# Patient Record
Sex: MALE | Race: WHITE | Employment: STUDENT | URBAN - METROPOLITAN AREA
[De-identification: names, ages, dates, MRNs, and addresses within clinical notes are randomized per-mention and may not be internally consistent; named-entity substitution may affect disease eponyms.]

---
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
## 2023-01-19 ENCOUNTER — HOSPITAL ENCOUNTER (EMERGENCY)
Age: 18
Discharge: HOME | End: 2023-01-20
Attending: STUDENT IN AN ORGANIZED HEALTH CARE EDUCATION/TRAINING PROGRAM
Payer: COMMERCIAL

## 2023-01-19 ENCOUNTER — APPOINTMENT (OUTPATIENT)
Dept: ULTRASOUND IMAGING | Age: 18
End: 2023-01-19
Payer: COMMERCIAL

## 2023-01-19 VITALS
RESPIRATION RATE: 16 BRPM | SYSTOLIC BLOOD PRESSURE: 128 MMHG | HEART RATE: 97 BPM | WEIGHT: 230 LBS | OXYGEN SATURATION: 98 % | BODY MASS INDEX: 31.19 KG/M2 | DIASTOLIC BLOOD PRESSURE: 81 MMHG | TEMPERATURE: 98.4 F

## 2023-01-19 DIAGNOSIS — E83.42 HYPOMAGNESEMIA: ICD-10-CM

## 2023-01-19 DIAGNOSIS — K92.0 HEMATEMESIS WITH NAUSEA: Primary | ICD-10-CM

## 2023-01-19 LAB
ALBUMIN SERPL-MCNC: 4.5 GM/DL (ref 3.4–5)
ALP BLD-CCNC: 66 IU/L (ref 40–129)
ALT SERPL-CCNC: 51 U/L (ref 10–40)
ANION GAP SERPL CALCULATED.3IONS-SCNC: 9 MMOL/L (ref 4–16)
APTT: 31.5 SECONDS (ref 25.1–37.1)
AST SERPL-CCNC: 27 IU/L (ref 15–37)
BASOPHILS ABSOLUTE: 0 K/CU MM
BASOPHILS RELATIVE PERCENT: 0.2 % (ref 0–1)
BILIRUB SERPL-MCNC: 0.4 MG/DL (ref 0–1)
BILIRUBIN DIRECT: 0.2 MG/DL (ref 0–0.3)
BILIRUBIN, INDIRECT: 0.2 MG/DL (ref 0–0.7)
BUN BLDV-MCNC: 12 MG/DL (ref 6–23)
CALCIUM SERPL-MCNC: 9.2 MG/DL (ref 8.3–10.6)
CHLORIDE BLD-SCNC: 104 MMOL/L (ref 99–110)
CO2: 26 MMOL/L (ref 21–32)
CREAT SERPL-MCNC: 1 MG/DL (ref 0.9–1.3)
DIFFERENTIAL TYPE: ABNORMAL
EOSINOPHILS ABSOLUTE: 0 K/CU MM
EOSINOPHILS RELATIVE PERCENT: 0.3 % (ref 0–3)
GFR SERPL CREATININE-BSD FRML MDRD: >60 ML/MIN/1.73M2
GLUCOSE BLD-MCNC: 144 MG/DL (ref 70–99)
HCT VFR BLD CALC: 48.3 % (ref 42–52)
HEMOGLOBIN: 15.2 GM/DL (ref 13.5–18)
IMMATURE NEUTROPHIL %: 0.3 % (ref 0–0.43)
INR BLD: 1.08 INDEX
LIPASE: 25 IU/L (ref 13–60)
LYMPHOCYTES ABSOLUTE: 1.6 K/CU MM
LYMPHOCYTES RELATIVE PERCENT: 11.3 % (ref 24–44)
MAGNESIUM: 1.7 MG/DL (ref 1.8–2.4)
MCH RBC QN AUTO: 27.4 PG (ref 27–31)
MCHC RBC AUTO-ENTMCNC: 31.5 % (ref 32–36)
MCV RBC AUTO: 87 FL (ref 78–100)
MONOCYTES ABSOLUTE: 0.7 K/CU MM
MONOCYTES RELATIVE PERCENT: 4.7 % (ref 0–4)
NUCLEATED RBC %: 0 %
PDW BLD-RTO: 12.5 % (ref 11.7–14.9)
PLATELET # BLD: 242 K/CU MM (ref 140–440)
PMV BLD AUTO: 10.3 FL (ref 7.5–11.1)
POTASSIUM SERPL-SCNC: 4 MMOL/L (ref 3.5–5.1)
PROTHROMBIN TIME: 13.9 SECONDS (ref 11.7–14.5)
RBC # BLD: 5.55 M/CU MM (ref 4.6–6.2)
SEGMENTED NEUTROPHILS ABSOLUTE COUNT: 11.6 K/CU MM
SEGMENTED NEUTROPHILS RELATIVE PERCENT: 83.2 % (ref 36–66)
SODIUM BLD-SCNC: 139 MMOL/L (ref 135–145)
TOTAL IMMATURE NEUTOROPHIL: 0.04 K/CU MM
TOTAL NUCLEATED RBC: 0 K/CU MM
TOTAL PROTEIN: 7.4 GM/DL (ref 6.4–8.2)
WBC # BLD: 14 K/CU MM (ref 4–10.5)

## 2023-01-19 PROCEDURE — 80053 COMPREHEN METABOLIC PANEL: CPT

## 2023-01-19 PROCEDURE — 2500000003 HC RX 250 WO HCPCS: Performed by: STUDENT IN AN ORGANIZED HEALTH CARE EDUCATION/TRAINING PROGRAM

## 2023-01-19 PROCEDURE — 83735 ASSAY OF MAGNESIUM: CPT

## 2023-01-19 PROCEDURE — 2580000003 HC RX 258: Performed by: STUDENT IN AN ORGANIZED HEALTH CARE EDUCATION/TRAINING PROGRAM

## 2023-01-19 PROCEDURE — 99284 EMERGENCY DEPT VISIT MOD MDM: CPT

## 2023-01-19 PROCEDURE — 76705 ECHO EXAM OF ABDOMEN: CPT

## 2023-01-19 PROCEDURE — 85610 PROTHROMBIN TIME: CPT

## 2023-01-19 PROCEDURE — 6360000002 HC RX W HCPCS: Performed by: STUDENT IN AN ORGANIZED HEALTH CARE EDUCATION/TRAINING PROGRAM

## 2023-01-19 PROCEDURE — 85025 COMPLETE CBC W/AUTO DIFF WBC: CPT

## 2023-01-19 PROCEDURE — 85730 THROMBOPLASTIN TIME PARTIAL: CPT

## 2023-01-19 PROCEDURE — 82248 BILIRUBIN DIRECT: CPT

## 2023-01-19 PROCEDURE — 96374 THER/PROPH/DIAG INJ IV PUSH: CPT

## 2023-01-19 PROCEDURE — A4216 STERILE WATER/SALINE, 10 ML: HCPCS | Performed by: STUDENT IN AN ORGANIZED HEALTH CARE EDUCATION/TRAINING PROGRAM

## 2023-01-19 PROCEDURE — 83690 ASSAY OF LIPASE: CPT

## 2023-01-19 PROCEDURE — 96375 TX/PRO/DX INJ NEW DRUG ADDON: CPT

## 2023-01-19 RX ORDER — ONDANSETRON 4 MG/1
4 TABLET, ORALLY DISINTEGRATING ORAL 3 TIMES DAILY PRN
Qty: 21 TABLET | Refills: 0 | Status: SHIPPED | OUTPATIENT
Start: 2023-01-19

## 2023-01-19 RX ORDER — ONDANSETRON 2 MG/ML
4 INJECTION INTRAMUSCULAR; INTRAVENOUS EVERY 6 HOURS PRN
Status: DISCONTINUED | OUTPATIENT
Start: 2023-01-19 | End: 2023-01-20 | Stop reason: HOSPADM

## 2023-01-19 RX ORDER — MORPHINE SULFATE 4 MG/ML
4 INJECTION, SOLUTION INTRAMUSCULAR; INTRAVENOUS ONCE
Status: COMPLETED | OUTPATIENT
Start: 2023-01-19 | End: 2023-01-19

## 2023-01-19 RX ORDER — LANOLIN ALCOHOL/MO/W.PET/CERES
400 CREAM (GRAM) TOPICAL ONCE
Status: COMPLETED | OUTPATIENT
Start: 2023-01-19 | End: 2023-01-20

## 2023-01-19 RX ORDER — 0.9 % SODIUM CHLORIDE 0.9 %
1000 INTRAVENOUS SOLUTION INTRAVENOUS ONCE
Status: COMPLETED | OUTPATIENT
Start: 2023-01-19 | End: 2023-01-19

## 2023-01-19 RX ORDER — ONDANSETRON 4 MG/1
4 TABLET, FILM COATED ORAL 3 TIMES DAILY PRN
Qty: 15 TABLET | Refills: 0 | Status: SHIPPED | OUTPATIENT
Start: 2023-01-19

## 2023-01-19 RX ADMIN — SODIUM CHLORIDE 1000 ML: 9 INJECTION, SOLUTION INTRAVENOUS at 21:29

## 2023-01-19 RX ADMIN — ONDANSETRON 4 MG: 2 INJECTION INTRAMUSCULAR; INTRAVENOUS at 21:35

## 2023-01-19 RX ADMIN — MORPHINE SULFATE 4 MG: 4 INJECTION, SOLUTION INTRAMUSCULAR; INTRAVENOUS at 21:35

## 2023-01-19 RX ADMIN — FAMOTIDINE 20 MG: 10 INJECTION, SOLUTION INTRAVENOUS at 21:35

## 2023-01-19 NOTE — Clinical Note
Julee Sims was seen and treated in our emergency department on 1/19/2023. He may return to work on 01/21/2023. If you have any questions or concerns, please don't hesitate to call.       Cornelio Segundo MD

## 2023-01-20 PROCEDURE — 6370000000 HC RX 637 (ALT 250 FOR IP): Performed by: STUDENT IN AN ORGANIZED HEALTH CARE EDUCATION/TRAINING PROGRAM

## 2023-01-20 RX ADMIN — Medication 400 MG: at 00:10

## 2023-01-20 NOTE — ED PROVIDER NOTES
Emergency Department Encounter    Patient: Hunter Tamez  MRN: [id number]  : 2005  Date of Evaluation: 2023  ED Provider:  José Miguel Lawton DO    Triage Chief Complaint:   Hematemesis (Dark red blood)    [place]:  Hunter Tamez is a 19 y.o. male with no significant medical history who presents to the ED with a history of right upper quadrant pain which started today. Patient also endorses a history of hematemesis. Patient denies any associated history of fever, diarrhea/constipation, fatigue, recent surgery or procedure, dysuria, or penile discharge. ROS - see HPI, below listed is current ROS at time of my eval:  Review of Systems    ROS is an in history; otherwise unremarkable    Past Medical History:   Diagnosis Date    Allergic     Anxiety     Depression      History reviewed. No pertinent surgical history. History reviewed. No pertinent family history.   Social History     Socioeconomic History    Marital status: Single     Spouse name: Not on file    Number of children: Not on file    Years of education: Not on file    Highest education level: Not on file   Occupational History    Not on file   Tobacco Use    Smoking status: Never    Smokeless tobacco: Never   Substance and Sexual Activity    Alcohol use: No    Drug use: No    Sexual activity: Never   Other Topics Concern    Not on file   Social History Narrative    Not on file     Social Determinants of Health     Financial Resource Strain: Not on file   Food Insecurity: Not on file   Transportation Needs: Not on file   Physical Activity: Not on file   Stress: Not on file   Social Connections: Not on file   Intimate Partner Violence: Not on file   Housing Stability: Not on file     Current Facility-Administered Medications   Medication Dose Route Frequency Provider Last Rate Last Admin    ondansetron (ZOFRAN) injection 4 mg  4 mg IntraVENous Q6H PRN Wilma Medrano DO   4 mg at 234    magnesium oxide (MAG-OX) tablet 400 mg  400 mg Oral Once Wilma Monteirooro, DO         Current Outpatient Medications   Medication Sig Dispense Refill    ondansetron (ZOFRAN-ODT) 4 MG disintegrating tablet Take 1 tablet by mouth 3 times daily as needed for Nausea or Vomiting 21 tablet 0    albuterol sulfate HFA (PROVENTIL HFA) 108 (90 Base) MCG/ACT inhaler Inhale 2 puffs into the lungs every 6 hours as needed for Wheezing or Shortness of Breath 18 g 0    Pseudoephedrine-DM-GG (CAPMIST DM) 60- MG TABS Take 1 tablet by mouth every 6 hours as needed (Cough, congestion) (Patient not taking: Reported on 10/18/2022) 30 tablet 0    FLUoxetine (PROZAC) 20 MG capsule Take 60 mg by mouth daily (Patient not taking: Reported on 10/18/2022)      loratadine (CLARITIN) 10 MG capsule Take by mouth      diphenhydrAMINE-zinc acetate (BENADRYL) 1-0.1 % cream Apply topically 3 times daily as needed. (Patient not taking: Reported on 10/18/2022) 1 Tube 0     No Known Allergies    Nursing Notes Reviewed    Physical Exam:  Triage VS:    ED Triage Vitals [01/19/23 2044]   Enc Vitals Group      /81      Heart Rate 97      Resp 16      Temp 98.4 °F (36.9 °C)      Temp Source Oral      SpO2 98 %      Weight 230 lb (104.3 kg)      Height       Head Circumference       Peak Flow       Pain Score       Pain Loc       Pain Edu? Excl. in [address]? Physical Exam  Abdominal:          Comments: Mild to moderate right upper quadrants as well as epigastric tenderness         My pulse ox interpretation is - normal    General appearance:  Mild acute distress. Skin:  Warm. Dry. Eye:  Extraocular movements intact. Ears, nose, mouth and throat:  Oral mucosa moist   Neck:  Trachea midline. Extremity:  No obvious deformity, erythema, or warmth. No swelling. Normal ROM. Distal pulses intact. Heart:  Regular rate and rhythm, normal S1 & S2, no extra heart sounds.     Perfusion:  intact  Respiratory:  Lungs clear to auscultation bilaterally. Respirations nonlabored. Abdominal:  Normal bowel sounds. Soft. RUQ and epigastric tenderness. Non distended. No Organomegaly. No jacobo, Mcburney, Rovsing, fluctuance, shifting dullness  Back:  No CVA tenderness to palpation     Neurological:  Alert and oriented times 3. No focal neuro deficits.              Psychiatric:  Appropriate    I have reviewed and interpreted all of the currently available lab results from this visit (if applicable):  Results for orders placed or performed during the hospital encounter of 01/19/23   BMP   Result Value Ref Range    Sodium 139 135 - 145 MMOL/L    Potassium 4.0 3.5 - 5.1 MMOL/L    Chloride 104 99 - 110 mMol/L    CO2 26 21 - 32 MMOL/L    Anion Gap 9 4 - 16    BUN 12 6 - 23 MG/DL    Creatinine 1.0 0.9 - 1.3 MG/DL    Est, Glom Filt Rate >60 >60 mL/min/1.73m2    Glucose 144 (H) 70 - 99 MG/DL    Calcium 9.2 8.3 - 10.6 MG/DL   CBC with Auto Differential   Result Value Ref Range    WBC 14.0 (H) 4.0 - 10.5 K/CU MM    RBC 5.55 4.6 - 6.2 M/CU MM    Hemoglobin 15.2 13.5 - 18.0 GM/DL    Hematocrit 48.3 42 - 52 %    MCV 87.0 78 - 100 FL    MCH 27.4 27 - 31 PG    MCHC 31.5 (L) 32.0 - 36.0 %    RDW 12.5 11.7 - 14.9 %    Platelets 483 614 - 925 K/CU MM    MPV 10.3 7.5 - 11.1 FL    Differential Type AUTOMATED DIFFERENTIAL     Segs Relative 83.2 (H) 36 - 66 %    Lymphocytes % 11.3 (L) 24 - 44 %    Monocytes % 4.7 (H) 0 - 4 %    Eosinophils % 0.3 0 - 3 %    Basophils % 0.2 0 - 1 %    Segs Absolute 11.6 K/CU MM    Lymphocytes Absolute 1.6 K/CU MM    Monocytes Absolute 0.7 K/CU MM    Eosinophils Absolute 0.0 K/CU MM    Basophils Absolute 0.0 K/CU MM    Nucleated RBC % 0.0 %    Total Nucleated RBC 0.0 K/CU MM    Total Immature Neutrophil 0.04 K/CU MM    Immature Neutrophil % 0.3 0 - 0.43 %   Magnesium   Result Value Ref Range    Magnesium 1.7 (L) 1.8 - 2.4 mg/dl   Protime-INR   Result Value Ref Range    Protime 13.9 11.7 - 14.5 SECONDS    INR 1.08 INDEX   Hepatic Function Panel Result Value Ref Range    Albumin 4.5 3.4 - 5.0 GM/DL    Total Bilirubin 0.4 0.0 - 1.0 MG/DL    Bilirubin, Direct 0.2 0.0 - 0.3 MG/DL    Bilirubin, Indirect 0.2 0 - 0.7 MG/DL    Alkaline Phosphatase 66 40 - 129 IU/L    AST 27 15 - 37 IU/L    ALT 51 (H) 10 - 40 U/L    Total Protein 7.4 6.4 - 8.2 GM/DL   Lipase   Result Value Ref Range    Lipase 25 13 - 60 IU/L   APTT   Result Value Ref Range    aPTT 31.5 25.1 - 37.1 SECONDS      Radiographs (if obtained):  Radiologist's Report Reviewed:  No results found. EKG (if obtained): (All EKG's are interpreted by myself in the absence of a cardiologist)    CRITICAL CARE NOTE:  There was a high probability of clinically significant life-threatening deterioration of the patient's condition requiring my urgent intervention due to abdominal pain with hematemesis. IVF,IV medications and bedside monitoring was performed to address this. Total critical care time is AT LEAST 15 minutes. This includes vital sign monitoring, pulse oximetry monitoring, telemetry monitoring, clinical response to the IV medications, reviewing the nursing notes, consultation time, dictation/documentation time, and interpretation of the lab work. This time excludes time spent performing procedures and separately billable procedures and family discussion time. MDM:    History source: Patient    History Limitations: None    78-year-old male with no significant medical history who presents to the ED with a history of right upper quadrant pain which started today. Patient also endorses a history of hematemesis. Patient denies any associated history of fever, diarrhea/constipation, fatigue, recent surgery or procedure, dysuria, or penile discharge. Patient physical examination is significant for mild right upper quadrant and epigastric tenderness.      Differentials considered include, but are not limited to include:    - Cholecystitis, hepatitis, cholangitis, primary sclerosing cholangitis, HCC, KNOTT, cirrhosis, R lower lobe pneumonia  - Pancreatitis, PUD, gastritis, esophageal varices, mesenteric ischemia, SBO, abdominal hernia,    Laboratory evaluations  --Considered: CBC, Electrolytes, Lipase, UA, Lactic acid, troponin, Stool Studies, C. Diff  --Done: CBC, electrolytes, hepatic function panel, lipase, magnesium, coagulation profile  -- Significant results: mildly low magnesium levels    Radiology include:  ---Considered: CT-abdomen/Pelvis, US  --- Done: Right upper quadrant ultrasound  --Significant results: pending      Medications:   Morphine, Zofran, famotidine--all IV    Consults   -none     Social Determinants affecting management or disposition:  none    Disposition considered: discharge    Disposition  - Patient care is endorsed to Dr Eloina Alatmirano:  1. Hematemesis with nausea    2. Hypomagnesemia      Disposition referral (if applicable): Marvin Palma MD  [address] [place] [address]  [phone]    Schedule an appointment as soon as possible for a visit     Disposition medications (if applicable):  New Prescriptions    ONDANSETRON (ZOFRAN-ODT) 4 MG DISINTEGRATING TABLET    Take 1 tablet by mouth 3 times daily as needed for Nausea or Vomiting     ED Provider Disposition Time  DISPOSITION        Comment: Please note this report has been produced using speech recognition software and may contain errors related to that system including errors in grammar, punctuation, and spelling, as well as words and phrases that may be inappropriate. Efforts were made to edit the dictations.        [address] [facility],[address], DO  01/20/23 2067

## 2023-01-20 NOTE — ED PROVIDER NOTES
Emergency Department Encounter  Location: [address] [place] [address] EMERGENCY DEPARTMENT    Patient: Aretha Chavez  MRN: [id number]  : 2005  Date of evaluation: 2023  ED Provider: Hali Forrest MD    2300:p.mAliyah Chavez was checked out to me by Dr. Luis Felipe Gomez. Please see his/her initial documentation for details of the patient's initial ED presentation, physical exam and completed studies. In brief, Aretha Chavez is a 19 y.o. male that presented to the emergency department with nausea, vomiting, diarrhea and vomitus with blood.     I have reviewed and interpreted all of the currently available lab results and diagnostics from this visit:  Results for orders placed or performed during the hospital encounter of 23   BMP   Result Value Ref Range    Sodium 139 135 - 145 MMOL/L    Potassium 4.0 3.5 - 5.1 MMOL/L    Chloride 104 99 - 110 mMol/L    CO2 26 21 - 32 MMOL/L    Anion Gap 9 4 - 16    BUN 12 6 - 23 MG/DL    Creatinine 1.0 0.9 - 1.3 MG/DL    Est, Glom Filt Rate >60 >60 mL/min/1.73m2    Glucose 144 (H) 70 - 99 MG/DL    Calcium 9.2 8.3 - 10.6 MG/DL   CBC with Auto Differential   Result Value Ref Range    WBC 14.0 (H) 4.0 - 10.5 K/CU MM    RBC 5.55 4.6 - 6.2 M/CU MM    Hemoglobin 15.2 13.5 - 18.0 GM/DL    Hematocrit 48.3 42 - 52 %    MCV 87.0 78 - 100 FL    MCH 27.4 27 - 31 PG    MCHC 31.5 (L) 32.0 - 36.0 %    RDW 12.5 11.7 - 14.9 %    Platelets 244 753 - 666 K/CU MM    MPV 10.3 7.5 - 11.1 FL    Differential Type AUTOMATED DIFFERENTIAL     Segs Relative 83.2 (H) 36 - 66 %    Lymphocytes % 11.3 (L) 24 - 44 %    Monocytes % 4.7 (H) 0 - 4 %    Eosinophils % 0.3 0 - 3 %    Basophils % 0.2 0 - 1 %    Segs Absolute 11.6 K/CU MM    Lymphocytes Absolute 1.6 K/CU MM    Monocytes Absolute 0.7 K/CU MM    Eosinophils Absolute 0.0 K/CU MM    Basophils Absolute 0.0 K/CU MM    Nucleated RBC % 0.0 %    Total Nucleated RBC 0.0 K/CU MM    Total Immature Neutrophil 0.04 K/CU MM    Immature Neutrophil % 0.3 0 - 0.43 %   Magnesium   Result Value Ref Range    Magnesium 1.7 (L) 1.8 - 2.4 mg/dl   Protime-INR   Result Value Ref Range    Protime 13.9 11.7 - 14.5 SECONDS    INR 1.08 INDEX   Hepatic Function Panel   Result Value Ref Range    Albumin 4.5 3.4 - 5.0 GM/DL    Total Bilirubin 0.4 0.0 - 1.0 MG/DL    Bilirubin, Direct 0.2 0.0 - 0.3 MG/DL    Bilirubin, Indirect 0.2 0 - 0.7 MG/DL    Alkaline Phosphatase 66 40 - 129 IU/L    AST 27 15 - 37 IU/L    ALT 51 (H) 10 - 40 U/L    Total Protein 7.4 6.4 - 8.2 GM/DL   Lipase   Result Value Ref Range    Lipase 25 13 - 60 IU/L   APTT   Result Value Ref Range    aPTT 31.5 25.1 - 37.1 SECONDS     US ABDOMEN LIMITED Specify organ? GALLBLADDER    Result Date: 1/19/2023  EXAMINATION: RIGHT UPPER QUADRANT ULTRASOUND 1/19/2023 10:22 pm COMPARISON: None. HISTORY: ORDERING SYSTEM PROVIDED HISTORY: RUQ pain TECHNOLOGIST PROVIDED HISTORY: Reason for exam:->RUQ pain Specify organ?->GALLBLADDER FINDINGS: LIVER:  Diffuse fatty liver infiltration. No focal mass is identified. No ductal dilation. Focal fatty sparing noted adjacent to the gallbladder fossa. BILIARY SYSTEM:  Gallbladder is unremarkable without evidence of pericholecystic fluid, wall thickening or stones. Negative sonographic Osman's sign. Common bile duct is within normal limits measuring 4.2 mm. RIGHT KIDNEY: The right kidney is grossly unremarkable without evidence of hydronephrosis. PANCREAS:  Visualized portions of the pancreas are unremarkable. OTHER: No evidence of right upper quadrant ascites. 1. Fatty liver infiltration. Focal fatty sparing adjacent to the gallbladder fossa. 2. No cholelithiasis or cholecystitis. Final ED Course and MDM:    CC/HPI Summary, DDx, ED Course, and Reassessment: Patient had presented with nausea, vomiting, diarrhea and hematemesis, likely Caty-Ryder tear. He is hemodynamically stable.   He did have a leukocytosis but otherwise normal hemoglobin and no metabolic abnormalities, transaminitis or elevated lipase. Right upper quadrant sound was unremarkable for any acute findings. Patient with improved symptoms after treatment here in the ED. Low suspicion for other acute life-threatening emergent process. Doubt variceal bleed. Patient will be discharged home with continued supportive care. History from : Patient    Limitations to history : None    Patient was given the following medications:  Medications   ondansetron (ZOFRAN) injection 4 mg (4 mg IntraVENous Given 1/19/23 2135)   magnesium oxide (MAG-OX) tablet 400 mg (has no administration in time range)   0.9 % sodium chloride bolus (0 mLs IntraVENous Stopped 1/19/23 2247)   morphine sulfate (PF) injection 4 mg (4 mg IntraVENous Given 1/19/23 2135)   famotidine (PEPCID) 20 mg in sodium chloride (PF) 0.9 % 10 mL injection (20 mg IntraVENous Given 1/19/23 2135)       Independent Imaging Interpretation by me:     EKG (if obtained): (All EKG's are interpreted by myself in the absence of a cardiologist)     Chronic conditions affecting care: none    Discussion with Other Profesionals : None    Social Determinants : None    Records Reviewed : None      I am the Primary Clinician of Record. Final Impression      1. Hematemesis with nausea    2.  Hypomagnesemia        DISPOSITION Decision To Discharge 01/19/2023 11:41:10 PM     (Please note that portions of this note may have been completed with a voice recognition program. Efforts were made to edit the dictations but occasionally words are mis-transcribed.)    Ansel Lanes, MD  [address] [place] [address], MD  01/19/23 1946

## 2023-03-30 ENCOUNTER — HOSPITAL ENCOUNTER (EMERGENCY)
Age: 18
Discharge: HOME | End: 2023-03-30
Payer: COMMERCIAL

## 2023-03-30 VITALS
TEMPERATURE: 98.5 F | BODY MASS INDEX: 38.65 KG/M2 | HEART RATE: 72 BPM | WEIGHT: 270 LBS | HEIGHT: 70 IN | RESPIRATION RATE: 16 BRPM | SYSTOLIC BLOOD PRESSURE: 132 MMHG | DIASTOLIC BLOOD PRESSURE: 78 MMHG | OXYGEN SATURATION: 100 %

## 2023-03-30 DIAGNOSIS — T78.40XA ACUTE ALLERGIC REACTION, INITIAL ENCOUNTER: Primary | ICD-10-CM

## 2023-03-30 PROCEDURE — 6370000000 HC RX 637 (ALT 250 FOR IP): Performed by: PHYSICIAN ASSISTANT

## 2023-03-30 PROCEDURE — 99283 EMERGENCY DEPT VISIT LOW MDM: CPT

## 2023-03-30 RX ORDER — PREDNISONE 20 MG/1
60 TABLET ORAL ONCE
Status: COMPLETED | OUTPATIENT
Start: 2023-03-30 | End: 2023-03-30

## 2023-03-30 RX ORDER — DIPHENHYDRAMINE HCL 25 MG
50 TABLET ORAL ONCE
Status: COMPLETED | OUTPATIENT
Start: 2023-03-30 | End: 2023-03-30

## 2023-03-30 RX ORDER — FAMOTIDINE 20 MG/1
20 TABLET, FILM COATED ORAL ONCE
Status: COMPLETED | OUTPATIENT
Start: 2023-03-30 | End: 2023-03-30

## 2023-03-30 RX ADMIN — FAMOTIDINE 20 MG: 20 TABLET, FILM COATED ORAL at 20:32

## 2023-03-30 RX ADMIN — DIPHENHYDRAMINE HYDROCHLORIDE 50 MG: 25 TABLET ORAL at 20:32

## 2023-03-30 RX ADMIN — PREDNISONE 60 MG: 20 TABLET ORAL at 20:32

## 2023-03-30 ASSESSMENT — LIFESTYLE VARIABLES
HOW MANY STANDARD DRINKS CONTAINING ALCOHOL DO YOU HAVE ON A TYPICAL DAY: PATIENT DOES NOT DRINK
HOW OFTEN DO YOU HAVE A DRINK CONTAINING ALCOHOL: NEVER

## 2023-03-30 NOTE — ED TRIAGE NOTES
Patient presents to the ED with complaint of allergic reaction. Patient states his throat is itchy and has red bumps on the back, patient also states his forehead is swelling and his cheeks. Patient states his symptoms started an hour ago and have been getting worse. Patient states he took 30mg of zyrtec prior to coming in.

## 2023-03-31 NOTE — ED PROVIDER NOTES
Triage Chief Complaint:   Allergic Reaction    Jackson:  Today in the ED I had the pleasure of caring for Jessie Benton who is a 19 y.o. male that presents today to the emergency department for allergic reaction. Patient has seasonal allergies no medication or food allergies. States that just prior to arrival patient started feeling itchy in the throat. Watery eyes. Felt like he was having swelling around his eyes so he came in for evaluation. Denies any tightness in the throat. States he feels like his symptoms have been improving. He has not taken any medications to help with his symptoms. They seem to be improving spontaneously. No shortness of breath. No lightheadedness or dizziness no headache. No recent illness such as fever chills nausea vomiting diarrhea. No associated chest pain or tightness. .    ROS:  REVIEW OF SYSTEMS    At least 10 systems reviewed      All other review of systems are negative  See HPI and nursing notes for additional information       Past Medical History:   Diagnosis Date    Allergic     Anxiety     Depression      No past surgical history on file. No family history on file.   Social History     Socioeconomic History    Marital status: Single     Spouse name: Not on file    Number of children: Not on file    Years of education: Not on file    Highest education level: Not on file   Occupational History    Not on file   Tobacco Use    Smoking status: Never    Smokeless tobacco: Never   Substance and Sexual Activity    Alcohol use: No    Drug use: No    Sexual activity: Never   Other Topics Concern    Not on file   Social History Narrative    Not on file     Social Determinants of Health     Financial Resource Strain: Not on file   Food Insecurity: Not on file   Transportation Needs: Not on file   Physical Activity: Not on file   Stress: Not on file   Social Connections: Not on file   Intimate Partner Violence: Not on file   Housing Stability: Not on file     No current regular rate rhythm no murmurs rubs clicks or gallops  Lungs: Lungs are clear to auscultation there is no wheezing rhonchi or rales. There is no use of accessory muscles no nasal flaring identified. Chest wall: There is no tenderness to palpation over the chest wall or over ribs  Abdomen: Abdomen is soft nontender nondistended. There is no firm or pulsatile masses no rebound rigidity or guarding negative Osman's negative McBurney, no peritoneal signs  Suprapubic:  there is no tenderness to palpation over the external bladder   Musculoskeletal: 5 out of 5 strength in all 4 extremities full flexion extension abduction and adduction supination pronation of all extremities and all digits. No obvious muscle atrophy is noted. No focal muscle deficits are appreciated  Dermatology: Skin is warm and dry there is no obvious abscesses lacerations or lesions noted  Psych: Mentation is grossly normal cognition is grossly normal. Affect is appropriate  Neuro: Motor intact sensory intact cranial nerves II through XII are intact level of consciousness is normal cerebellar function is normal reflexes are grossly normal. No evidence of incontinence or loss of bowel or bladder no saddle anesthesia noted Lymphatic: There is no submandibular or cervical adenopathy appreciated. I have reviewed and interpreted all of the currently available lab results from this visit (if applicable):  No results found for this visit on 03/30/23. Radiographs (if obtained):  [] The following radiograph was interpreted by myself in the absence of a radiologist:   [] Radiologist's Report Reviewed:  No orders to display       EKG (if obtained):   Please See Note of attending physician for EKG interpretation. Chart review shows recent radiograph(s):  No results found. MDM:     Problems Addressed:  1.  Acute allergic reaction, initial encounter        Interventions given this visit:   Orders Placed This Encounter   Medications    predniSONE

## 2023-05-09 ENCOUNTER — HOSPITAL ENCOUNTER (EMERGENCY)
Age: 18
Discharge: HOME | End: 2023-05-09
Payer: COMMERCIAL

## 2023-05-09 VITALS
SYSTOLIC BLOOD PRESSURE: 138 MMHG | OXYGEN SATURATION: 99 % | RESPIRATION RATE: 19 BRPM | DIASTOLIC BLOOD PRESSURE: 97 MMHG | TEMPERATURE: 99.1 F | HEART RATE: 101 BPM

## 2023-05-09 DIAGNOSIS — S61.210A LACERATION OF RIGHT INDEX FINGER WITHOUT FOREIGN BODY WITHOUT DAMAGE TO NAIL, INITIAL ENCOUNTER: Primary | ICD-10-CM

## 2023-05-09 PROCEDURE — 99282 EMERGENCY DEPT VISIT SF MDM: CPT

## 2023-05-09 PROCEDURE — 12001 RPR S/N/AX/GEN/TRNK 2.5CM/<: CPT

## 2023-05-10 NOTE — ED PROVIDER NOTES
congestion), Disp-30 tablet,R-0Print      FLUoxetine (PROZAC) 20 MG capsule Take 60 mg by mouth dailyHistorical Med      loratadine (CLARITIN) 10 MG capsule Take by mouthHistorical Med      diphenhydrAMINE-zinc acetate (BENADRYL) 1-0.1 % cream Apply topically 3 times daily as needed. , Disp-1 Tube, R-0, Print           No Known Allergies     Physical Exam       ED Triage Vitals [05/09/23 2041]   BP Temp Temp Source Pulse Respirations SpO2 Height Weight   (!) 138/97 99.1 °F (37.3 °C) Oral (!) 101 19 99 % -- --     GENERAL APPEARANCE:  Well-developed, well-nourished, no acute distress. HEAD:  NC/AT. EYES:  Sclera anicteric. ENT:  Ears, nose, mouth normal.     NECK:  Supple. LUNGS:   Respirations unlabored. EXTREMITIES:  No acute deformities. SKIN:  Warm and dry. 1.5 cm linear laceration, edges well approximated, to the distal right index finger. NEUROLOGICAL:  Alert and oriented. PSYCHIATRIC:  Normal mood. Diagnostics     Labs:  No results found for this visit on 05/09/23. Radiographs:  No results found. Procedures/EKG:     Patient Name: David Rodriguez   Medical Record Number: [id number]  Date: 5/10/2023   Time: 1:46 AM   Room/Bed: ED01/ED-01    Laceration Repair Procedure Note    Indication: Laceration to right index finger    Procedure: The patient was placed in the appropriate position and anesthesia was not required. The area was then cleansed using HibiClens and irrigated with NS. The laceration was closed using SkinAffix. Total repaired wound length: 1.5 cm. The patient tolerated the procedure well. No immediate complications. ED Course and MDM     Chief Complaint/HPI Summary/Differential Diagnosis:  Patient presents to the ED with chief complaint of a laceration. Patient seen and evaluated. Triage and nursing notes reviewed and incorporated.        History from : Patient    Limitations to history : None    Patient was given the following medications:  Medications - No

## 2023-12-15 ENCOUNTER — HOSPITAL ENCOUNTER (EMERGENCY)
Age: 18
Discharge: HOME | End: 2023-12-15
Payer: COMMERCIAL

## 2023-12-15 ENCOUNTER — APPOINTMENT (OUTPATIENT)
Dept: GENERAL RADIOLOGY | Age: 18
End: 2023-12-15
Payer: COMMERCIAL

## 2023-12-15 VITALS
TEMPERATURE: 97.6 F | SYSTOLIC BLOOD PRESSURE: 148 MMHG | RESPIRATION RATE: 18 BRPM | OXYGEN SATURATION: 98 % | DIASTOLIC BLOOD PRESSURE: 103 MMHG | HEART RATE: 99 BPM

## 2023-12-15 DIAGNOSIS — H65.02 ACUTE SEROUS OTITIS MEDIA OF LEFT EAR, RECURRENCE NOT SPECIFIED: Primary | ICD-10-CM

## 2023-12-15 PROCEDURE — 71046 X-RAY EXAM CHEST 2 VIEWS: CPT

## 2023-12-15 PROCEDURE — 99283 EMERGENCY DEPT VISIT LOW MDM: CPT

## 2023-12-15 RX ORDER — ALBUTEROL SULFATE 90 UG/1
2 AEROSOL, METERED RESPIRATORY (INHALATION) 4 TIMES DAILY PRN
Qty: 18 G | Refills: 0 | Status: SHIPPED | OUTPATIENT
Start: 2023-12-15

## 2023-12-15 RX ORDER — AMOXICILLIN 500 MG/1
500 CAPSULE ORAL 2 TIMES DAILY
Qty: 10 CAPSULE | Refills: 0 | Status: SHIPPED | OUTPATIENT
Start: 2023-12-15 | End: 2023-12-20

## 2023-12-15 NOTE — ED PROVIDER NOTES
[facility] ENCOUNTER      Pt Name: Gino Dan  MRN: [id number]  [address] [facility] 2005  Date of evaluation: 12/15/2023  Provider: MAICOL Bazan CNP  PCP: MAICOL Gonzalez NP  Note Started: 6:56 PM EST 12/15/23    I am the Primary Clinician of Record. PATSY. I have evaluated this patient. CHIEF COMPLAINT       Chief Complaint   Patient presents with    Otalgia     Left ear x3-4 days    Cough     X1 week       HISTORY OF PRESENT ILLNESS: 1 or more Elements   Gino Dan is a 19 y.o. male who presents to the ER with chief complaint of left ear pain and productive cough that have been ongoing for the past 4-5 days. He reports subjective fever. He stattes he has asthma and also needs an inhaler. I have reviewed the nursing triage documentation and agree unless otherwise noted. REVIEW OF SYSTEMS :    Review of Systems   Constitutional:  Positive for fatigue and fever (subjective). Negative for chills. HENT:  Positive for ear discharge (left). Negative for congestion. Respiratory:  Positive for cough (white productive cough). Negative for chest tightness and shortness of breath. Cardiovascular:  Negative for chest pain and leg swelling. Gastrointestinal:  Negative for abdominal pain. Genitourinary:  Negative for difficulty urinating and dysuria. Musculoskeletal:  Negative for myalgias. Skin:  Negative for color change and rash. Neurological:  Negative for dizziness, weakness and headaches. Psychiatric/Behavioral:  Negative for confusion. Positives and Pertinent negatives as per HPI. SURGICAL HISTORY   History reviewed. No pertinent surgical history.     [id number] [facility]       Discharge Medication List as of 12/15/2023 10:22 PM        CONTINUE these medications which have NOT CHANGED    Details   ondansetron (ZOFRAN-ODT) 4 MG disintegrating tablet Take 1 tablet

## 2024-10-10 ENCOUNTER — HOSPITAL ENCOUNTER (EMERGENCY)
Age: 19
Discharge: HOME | End: 2024-10-10
Payer: COMMERCIAL

## 2024-10-10 VITALS
BODY MASS INDEX: 35.07 KG/M2 | DIASTOLIC BLOOD PRESSURE: 95 MMHG | SYSTOLIC BLOOD PRESSURE: 147 MMHG | OXYGEN SATURATION: 99 % | TEMPERATURE: 98.2 F | HEART RATE: 93 BPM | HEIGHT: 70 IN | RESPIRATION RATE: 18 BRPM | WEIGHT: 245 LBS

## 2024-10-10 DIAGNOSIS — J06.9 ACUTE UPPER RESPIRATORY INFECTION: Primary | ICD-10-CM

## 2024-10-10 PROCEDURE — 99283 EMERGENCY DEPT VISIT LOW MDM: CPT

## 2024-10-10 RX ORDER — DOXYCYCLINE HYCLATE 100 MG
100 TABLET ORAL 2 TIMES DAILY
Qty: 14 TABLET | Refills: 0 | Status: SHIPPED | OUTPATIENT
Start: 2024-10-10 | End: 2024-10-17

## 2024-10-10 RX ORDER — IBUPROFEN 600 MG/1
600 TABLET, FILM COATED ORAL 3 TIMES DAILY PRN
Qty: 30 TABLET | Refills: 0 | Status: SHIPPED | OUTPATIENT
Start: 2024-10-10

## 2024-10-10 ASSESSMENT — PAIN DESCRIPTION - DESCRIPTORS: DESCRIPTORS: ACHING

## 2024-10-10 ASSESSMENT — PAIN SCALES - GENERAL
PAINLEVEL_OUTOF10: 7
PAINLEVEL_OUTOF10: 5

## 2024-10-10 ASSESSMENT — PAIN DESCRIPTION - ORIENTATION
ORIENTATION: LEFT
ORIENTATION: LEFT

## 2024-10-10 ASSESSMENT — LIFESTYLE VARIABLES
HOW OFTEN DO YOU HAVE A DRINK CONTAINING ALCOHOL: NEVER
HOW MANY STANDARD DRINKS CONTAINING ALCOHOL DO YOU HAVE ON A TYPICAL DAY: PATIENT DOES NOT DRINK

## 2024-10-10 ASSESSMENT — PAIN DESCRIPTION - LOCATION
LOCATION: EAR
LOCATION: EAR

## 2024-10-10 ASSESSMENT — PAIN - FUNCTIONAL ASSESSMENT: PAIN_FUNCTIONAL_ASSESSMENT: 0-10

## 2024-10-11 NOTE — ED PROVIDER NOTES
intact.      Conjunctiva/sclera: Conjunctivae normal.      Pupils: Pupils are equal, round, and reactive to light.   Cardiovascular:      Rate and Rhythm: Normal rate and regular rhythm.      Pulses: Normal pulses.      Heart sounds: Normal heart sounds.   Pulmonary:      Effort: Pulmonary effort is normal. No respiratory distress.      Breath sounds: Normal breath sounds.   Musculoskeletal:         General: Normal range of motion.      Cervical back: Normal range of motion and neck supple.   Skin:     General: Skin is warm and dry.      Capillary Refill: Capillary refill takes less than 2 seconds.   Neurological:      Mental Status: He is alert and oriented to person, place, and time.      Motor: No weakness.      Gait: Gait normal.   Psychiatric:         Mood and Affect: Mood normal.         Behavior: Behavior normal.             DIAGNOSTIC RESULTS   LABS:    Labs Reviewed - No data to display    When ordered only abnormal lab results are displayed. All other labs were within normal range or not returned as of this dictation.    EKG: When ordered, EKG's are interpreted by the Emergency Department Physician in the absence of a cardiologist.  Please see their note for interpretation of EKG.    RADIOLOGY:   Non-plain film images such as CT, Ultrasound and MRI are read by the radiologist. Plain radiographic images are visualized and preliminarily interpreted by the ED Provider with the below findings:        Interpretation per the Radiologist below, if available at the time of this note:    No orders to display     No results found.    No results found.    PROCEDURES   Unless otherwise noted below, none     Procedures    CRITICAL CARE TIME (.cctime)         EMERGENCY DEPARTMENT COURSE and DIFFERENTIAL DIAGNOSIS/MDM:   Vitals:    Vitals:    10/10/24 2229 10/10/24 2231 10/10/24 2233 10/10/24 2240   BP:   (!) 147/95    Pulse:   100 96   Resp:   18    Temp:  98.3 °F (36.8 °C)     TempSrc:  Oral     SpO2:   99%    Weight:

## 2024-12-17 ENCOUNTER — HOSPITAL ENCOUNTER (EMERGENCY)
Age: 19
Discharge: HOME | End: 2024-12-17
Payer: COMMERCIAL

## 2024-12-17 VITALS
RESPIRATION RATE: 19 BRPM | OXYGEN SATURATION: 98 % | DIASTOLIC BLOOD PRESSURE: 81 MMHG | HEIGHT: 72 IN | BODY MASS INDEX: 35.21 KG/M2 | TEMPERATURE: 98.7 F | WEIGHT: 260 LBS | HEART RATE: 97 BPM | SYSTOLIC BLOOD PRESSURE: 139 MMHG

## 2024-12-17 DIAGNOSIS — J02.9 ACUTE PHARYNGITIS, UNSPECIFIED ETIOLOGY: Primary | ICD-10-CM

## 2024-12-17 DIAGNOSIS — Z87.09 HISTORY OF ASTHMA: ICD-10-CM

## 2024-12-17 LAB
S PYO AG THROAT QL: NEGATIVE
SPECIMEN SOURCE: NORMAL

## 2024-12-17 PROCEDURE — 99283 EMERGENCY DEPT VISIT LOW MDM: CPT

## 2024-12-17 PROCEDURE — 87081 CULTURE SCREEN ONLY: CPT

## 2024-12-17 PROCEDURE — 87880 STREP A ASSAY W/OPTIC: CPT

## 2024-12-17 RX ORDER — PREDNISONE 20 MG/1
40 TABLET ORAL DAILY
Qty: 10 TABLET | Refills: 0 | Status: SHIPPED | OUTPATIENT
Start: 2024-12-17 | End: 2024-12-22

## 2024-12-17 ASSESSMENT — PAIN SCALES - GENERAL
PAINLEVEL_OUTOF10: 0
PAINLEVEL_OUTOF10: 4

## 2024-12-17 ASSESSMENT — PAIN - FUNCTIONAL ASSESSMENT
PAIN_FUNCTIONAL_ASSESSMENT: 0-10
PAIN_FUNCTIONAL_ASSESSMENT: PREVENTS OR INTERFERES SOME ACTIVE ACTIVITIES AND ADLS
PAIN_FUNCTIONAL_ASSESSMENT: 0-10

## 2024-12-17 ASSESSMENT — PAIN DESCRIPTION - DESCRIPTORS: DESCRIPTORS: BURNING;ITCHING;SORE

## 2024-12-17 ASSESSMENT — PAIN DESCRIPTION - ORIENTATION: ORIENTATION: POSTERIOR

## 2024-12-17 ASSESSMENT — PAIN DESCRIPTION - LOCATION: LOCATION: THROAT

## 2024-12-18 NOTE — ED PROVIDER NOTES
precautions if patient develops new or worsening symptoms.  Follow-up plan and return precautions were provided and discussed in detail patient in agreement.    I am the Primary Clinician of Record.    This patient was evaluated, managed, and treated in the context of the COVID-19 pandemic. As such, associated resources were utilized in his care. I did don/doff appropriate PPE (including N95 mask, safety glasses, gown, gloves), as recommended by the health facility/national standard best practice, during my bedside interactions with the patient.      The patient tolerated their visit well.  I evaluated the patient.  The physician was available for consultation as needed.  The patient and / or the family were informed of the results of any tests, a time was given to answer questions, a plan was proposed and they agreed with plan.       CLINICAL IMPRESSION:  1. Acute pharyngitis, unspecified etiology    2. History of asthma        Is this patient to be included in the SEP-1 Core Measure due to severe sepsis or septic shock?   No   Exclusion criteria - the patient is NOT to be included for SEP-1 Core Measure due to:  2+ SIRS criteria are not met    DISPOSITION Decision To Discharge 12/17/2024 09:39:04 PM   DISPOSITION CONDITION Stable          PATIENT REFERRED TO:  Suzanne Bella APRN - NP  [address] [facility]  [id number] [address]  [phone]            DISCHARGE MEDICATIONS:  Discharge Medication List as of 12/17/2024  9:46 PM        START taking these medications    Details   predniSONE (DELTASONE) 20 MG tablet Take 2 tablets by mouth daily for 5 days, Disp-10 tablet, R-0Normal             DISCONTINUED MEDICATIONS:  Discharge Medication List as of 12/17/2024  9:46 PM                 (Please note the MDM and HPI sections of this note were completed with a voice recognition program.  Efforts were made to edit the dictations but occasionally words are mis-transcribed.)    Electronically signed, Daniele

## 2024-12-18 NOTE — DISCHARGE INSTRUCTIONS
Please call your primary care provider to schedule an appointment for reevaluation of any persisting symptoms within 7 to 10 days and to discuss your visit to the emergency department.  If you do not have a primary care provider, you can contact the primary care referral line, or the provider listed in your after visit summary paperwork provided to you today.  Return to emergency department if you develop any difficulty breathing, difficulty swallowing;  neck swelling or painful large lumps in your neck, or if sore throat worsens and does not improve or worsens after 3 days; any uncontrollable vomiting or if you are unable  to keep fluids down. Return with any  weakness, chest pain, confusion, passing out, worsening symptoms or any new concerns.  To help with your symptoms, you can use the following:    For congestion, you could use saline nasal sprays, irrigation or rinses.  Adults may find relief from over-the-counter decongestants.    For sore throat you could use over-the-counter pain reliever such as  acetaminophen (Tylenol).*  Gargle with warm salt water, or use any over-the-counter throat sprays.    For fevers, chills, muscle aches, or any headaches, you can use over-the-counter pain relievers such as ibuprofen and/or acetaminophen (Tylenol).*    For cough, you could use vaporizers, humidifiers, warm baths/showers.    Stay hydrated, get plenty of rest, eat small frequent nutritious meals and snacks.  Hot liquids (tea, broth, soup) if age-appropriate may also help with your symptoms.    Practice good hand hygiene; avoid touching your mouth nose and eyes.  Wear a mask.    *(Over-the-counter pain medication such as ibuprofen or Tylenol are typically safe to use, however talk with your doctor to make sure these are safe for you.)

## 2024-12-19 LAB
MICROORGANISM SPEC CULT: NORMAL
SPECIMEN DESCRIPTION: NORMAL

## 2024-12-20 NOTE — PROGRESS NOTES
Pharmacy Note  ED Culture Follow-up    Baljeet De Leon is a 19 y.o. male.     Allergies: Patient has no known allergies.     Current antimicrobials:   Reviewed patient's throat culture - culture is negative. Patient was appropriately discharged on no antimicrobial therapy. No further action needed.     Please call with any questions. Ext. [phone]    Cheryl Coreas PharmD Candidate 2025 12/20/2024 7:57 AM